# Patient Record
Sex: MALE | Race: WHITE | NOT HISPANIC OR LATINO
[De-identification: names, ages, dates, MRNs, and addresses within clinical notes are randomized per-mention and may not be internally consistent; named-entity substitution may affect disease eponyms.]

---

## 2017-04-13 ENCOUNTER — APPOINTMENT (OUTPATIENT)
Dept: PULMONOLOGY | Facility: CLINIC | Age: 72
End: 2017-04-13

## 2017-04-18 RX ORDER — MOMETASONE FUROATE AND FORMOTEROL FUMARATE DIHYDRATE 200; 5 UG/1; UG/1
2 AEROSOL RESPIRATORY (INHALATION)
Qty: 0 | Refills: 0 | COMMUNITY

## 2017-04-18 RX ORDER — FONDAPARINUX SODIUM 2.5 MG/.5ML
1 INJECTION, SOLUTION SUBCUTANEOUS
Qty: 0 | Refills: 0 | COMMUNITY

## 2017-04-18 RX ORDER — TAMSULOSIN HYDROCHLORIDE 0.4 MG/1
1 CAPSULE ORAL
Qty: 0 | Refills: 0 | COMMUNITY

## 2017-04-18 RX ORDER — PANTOPRAZOLE SODIUM 20 MG/1
1 TABLET, DELAYED RELEASE ORAL
Qty: 0 | Refills: 0 | COMMUNITY

## 2017-04-18 RX ORDER — FUROSEMIDE 40 MG
1 TABLET ORAL
Qty: 0 | Refills: 0 | COMMUNITY

## 2017-04-18 RX ORDER — LOSARTAN POTASSIUM 100 MG/1
1 TABLET, FILM COATED ORAL
Qty: 0 | Refills: 0 | COMMUNITY

## 2017-04-18 RX ORDER — POTASSIUM CHLORIDE 20 MEQ
1 PACKET (EA) ORAL
Qty: 0 | Refills: 0 | COMMUNITY

## 2017-04-18 RX ORDER — TIOTROPIUM BROMIDE AND OLODATEROL 3.124; 2.736 UG/1; UG/1
2 SPRAY, METERED RESPIRATORY (INHALATION)
Qty: 0 | Refills: 0 | COMMUNITY

## 2017-04-18 RX ORDER — LEVOCETIRIZINE DIHYDROCHLORIDE 0.5 MG/ML
1 SOLUTION ORAL
Qty: 0 | Refills: 0 | COMMUNITY

## 2017-04-18 RX ORDER — SERTRALINE 25 MG/1
1 TABLET, FILM COATED ORAL
Qty: 0 | Refills: 0 | COMMUNITY

## 2017-04-18 NOTE — H&P ADULT - PMH
BPH (benign prostatic hyperplasia)    Coronary artery disease    Deep venous thrombosis    Hyperlipidemia    Hypertension

## 2017-04-18 NOTE — H&P ADULT - EXTREMITIES COMMENTS
+ B/L LE Varicosities + RUE forearm ecchymosis from failed blood draws earlier this week.  B/L LE Varicosities

## 2017-04-18 NOTE — H&P ADULT - HISTORY OF PRESENT ILLNESS
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, former smoker/ETOH abuser, with PMHx of HTN, dyslipidemia, GERD, pAfib, RLE DVT on Xarelto (last dose 4/17 PM), subclavian steel syndrome, COPD (denies hospitalizations/intubations/home O2), PVD s/p right fem-pop bypass ~10 yrs ago, Known CAD s/p multiple prior PCIs and s/p 3VCABG @ [place] on 03/12/15 (EDUARD-LAD, LIMA-Ramus, SVG-OM), most recent cardiac catheterization@[facility] on 7/21/16 which revealed 50% dLM lesion, subtotal occlusion of the mLAD, 80% pLCx lesion,  of OM1, small nondominant RCA, EDUARD-pLAD is patent, SVG-OM1 has a 30% lesion at the distal anastomosis site, LIMA to the ramus is patent, EF:55%, 12mmHg. Patient recommended to continue medical therapy.        In light of pt's risk factors, Known CAD, above presenting  CCS Class 3 Anginal Equivalent Symptoms, pt is now referred  to [facility] for recommended left Heart Cath with possible intervention if clinically indicated.      MOST RECENT CATH HX:  @ [facility] 7/21/16: as above  @ [place] on 03/03/15: 2VCAD; 50-60% distal LM, 60-70% proximal LAD ISR, 30-50% mid LAD, Diag 1 stent jailed, 70-80% proximal LCx ISR, 50-60% distal LCx, 30-50% OM1, No AS, LVEF of 50%.   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, former smoker/ETOH abuser, with PMHx of HTN, dyslipidemia, GERD, pAfib, RLE DVT on Xarelto (last dose 4/17 PM), subclavian steel syndrome, COPD (denies hospitalizations/intubations/home O2), PVD s/p right fem-pop bypass ~10 yrs ago, Known CAD s/p multiple prior PCIs and s/p 3VCABG @ [place] on 03/12/15 (EDUARD-LAD, LIMA-Ramus, SVG-OM), most recent cardiac catheterization@[facility] on 7/21/16 which revealed 50% dLM lesion, subtotal occlusion of the mLAD, 80% pLCx lesion,  of OM1, small nondominant RCA, EDUARD-pLAD is patent, SVG-OM1 has a 30% lesion at the distal anastomosis site, LIMA to the ramus is patent, EF:55%, 12mmHg. Patient at which time was recommended to continue medical therapy. Patient now returned to cardiologist c/o progressively worsening ZHANG x several months. Patient states he cannot walk >200 yards prior to becoming winded and fatigued. Patient further reports bilateral LE edema. Patient denies any chest pain, palpitations, dizziness, recent PND or orthopnea.       In light of pt's risk factors, Known CAD, above presenting  CCS Class III Anginal Equivalent Symptoms, pt is now referred  to [facility] for recommended left Heart Cath with possible intervention if clinically indicated.        CATH HX:  @ [facility] 7/21/16: as above  @ [place] on 03/03/15: 2VCAD; 50-60% distal LM, 60-70% proximal LAD ISR, 30-50% mid LAD, Diag 1 stent jailed, 70-80% proximal LCx ISR, 50-60% distal LCx, 30-50% OM1, No AS, LVEF of 50%.   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, former smoker/ETOH abuser, with PMHx of HTN, dyslipidemia, GERD, pAfib, RLE DVT on Xarelto (last dose 4/17 PM), subclavian steel syndrome, COPD (denies hospitalizations/intubations/home O2), PVD s/p right fem-pop bypass ~10 yrs ago, Known CAD s/p multiple prior PCIs and s/p 3VCABG @ [place] on 03/12/15 (EDUARD-LAD, LIMA-Ramus, SVG-OM), most recent cardiac catheterization@[facility] on 7/21/16 which revealed 50% dLM lesion, subtotal occlusion of the mLAD, 80% pLCx lesion,  of OM1, small nondominant RCA, EDUARD-pLAD is patent, SVG-OM1 has a 30% lesion at the distal anastomosis site, LIMA to the ramus is patent, EF:55%, 12mmHg. Patient at which time was recommended to continue medical therapy. Patient now returned to cardiologist c/o progressively worsening ZHANG x several months. Patient states he cannot walk >200 yards prior to becoming winded and fatigued. Patient further reports bilateral LE edema. Patient denies any chest pain, palpitations, dizziness, recent PND or orthopnea. Echocardiogram on 4/13/17 revealed normal LV size and function, EF >55%. Mild MR/TR. Mild pulm HTN.     In light of pt's risk factors, Known CAD, above presenting  CCS Class III Anginal Equivalent Symptoms, pt is now referred  to [facility] for recommended left Heart Cath with possible intervention if clinically indicated.        CATH HX:  @ [facility] 7/21/16: as above  @ [place] on 03/03/15: 2VCAD; 50-60% distal LM, 60-70% proximal LAD ISR, 30-50% mid LAD, Diag 1 stent jailed, 70-80% proximal LCx ISR, 50-60% distal LCx, 30-50% OM1, No AS, LVEF of 50%.   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, former smoker/ETOH abuser, with PMHx of HTN, dyslipidemia, GERD,  CKD Stage 3 (Cr 1.5 last admission in 07/16), pAfib, RLE DVT on Xarelto (last dose 4/17 PM), subclavian steel syndrome, COPD (denies hospitalizations/intubations/home O2), PVD s/p right fem-pop bypass ~10 yrs ago, Known CAD s/p multiple prior PCIs and s/p 3VCABG @ [place] on 03/12/15 (EDUARD-LAD, LIMA-Ramus, SVG-OM), most recent cardiac catheterization@[facility] on 7/21/16 which revealed 50% dLM lesion, subtotal occlusion of the mLAD, 80% pLCx lesion,  of OM1, small nondominant RCA, EDUARD-pLAD is patent, SVG-OM1 has a 30% lesion at the distal anastomosis site, LIMA to the ramus is patent, EF:55%, 12mmHg. Patient at which time was recommended to continue medical therapy. Patient now returned to cardiologist c/o progressively worsening ZHANG x several months. Patient states he cannot walk >200 yards prior to becoming winded and fatigued. Patient further reports bilateral LE edema. Patient denies any chest pain, palpitations, dizziness, recent PND or orthopnea. Echocardiogram on 4/13/17 revealed normal LV size and function, EF >55%. Mild MR/TR. Mild pulm HTN.     In light of pt's risk factors, Known CAD, above presenting  CCS Class III Anginal Equivalent Symptoms, pt is now referred  to [facility] for recommended left Heart Cath with possible intervention if clinically indicated.        CATH HX:  @ [facility] 7/21/16: as above  @ [place] on 03/03/15: 2VCAD; 50-60% distal LM, 60-70% proximal LAD ISR, 30-50% mid LAD, Diag 1 stent jailed, 70-80% proximal LCx ISR, 50-60% distal LCx, 30-50% OM1, No AS, LVEF of 50%.   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, former smoker/ETOH abuser, with PMHx of HTN, dyslipidemia, GERD,  pAfib, RLE DVT on Xarelto (last dose 4/17 PM), subclavian steel syndrome, COPD (denies hospitalizations/intubations/home O2), PVD s/p right fem-pop bypass ~10 yrs ago, Known CAD s/p multiple prior PCIs and s/p 3VCABG @ [place] on 03/12/15 (EDUARD-LAD, LIMA-Ramus, SVG-OM), most recent cardiac catheterization@[facility] on 7/21/16 which revealed 50% dLM lesion, subtotal occlusion of the mLAD, 80% pLCx lesion,  of OM1, small nondominant RCA, EDUARD-pLAD is patent, SVG-OM1 has a 30% lesion at the distal anastomosis site, LIMA to the ramus is patent, EF:55%, 12mmHg. Patient at which time was recommended to continue medical therapy. Patient now returned to cardiologist c/o progressively worsening ZHANG x several months. Patient states he cannot walk >200 yards prior to becoming winded and fatigued. Patient further reports bilateral LE edema. Patient denies any chest pain, palpitations, dizziness, recent PND or orthopnea. Echocardiogram on 4/13/17 revealed normal LV size and function, EF >55%. Mild MR/TR. Mild pulm HTN.     In light of pt's risk factors, Known CAD, above presenting  CCS Class III Anginal Equivalent Symptoms, pt is now referred  to [facility] for recommended left Heart Cath with possible intervention if clinically indicated.        CATH HX:  @ [facility] 7/21/16: as above  @ [place] on 03/03/15: 2VCAD; 50-60% distal LM, 60-70% proximal LAD ISR, 30-50% mid LAD, Diag 1 stent jailed, 70-80% proximal LCx ISR, 50-60% distal LCx, 30-50% OM1, No AS, LVEF of 50%.   Recommended for CABG

## 2017-04-18 NOTE — H&P ADULT - PSH
Arm fracture, right    History of tonsillectomy    S/P CABG (coronary artery bypass graft)    Status post peripheral artery bypass

## 2017-04-18 NOTE — H&P ADULT - ASSESSMENT
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, former smoker/ETOH abuser, with PMHx of HTN, dyslipidemia, GERD, pAfib, RLE DVT on Xarelto (last dose 4/17 PM), subclavian steel syndrome, COPD (denies hospitalizations/intubations/home O2), PVD s/p right fem-pop bypass ~10 yrs ago, Known CAD s/p multiple prior PCIs and s/p 3VCABG @ [place] on 03/12/15 (EDUARD-LAD, LIMA-Ramus, SVG-OM), most recent cardiac catheterization@[facility] on 7/21/16 which revealed 50% dLM lesion, subtotal occlusion of the mLAD, 80% pLCx lesion,  of OM1, small nondominant RCA, EDUARD-pLAD is patent, SVG-OM1 has a 30% lesion at the distal anastomosis site, LIMA to the ramus is patent, EF:55%, 12mmHg. Patient at which time was recommended to continue medical therapy.   He presents today for recommended Cardiac Cath with possible intervention if clinically indicated secondary to CCS Anginal Class 3 Equivalent Symptoms.      ASA: III and Mallampati: III  ***OF NOTE: Pt 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, former smoker/ETOH abuser, with PMHx of HTN, dyslipidemia, GERD, pAfib, RLE DVT on Xarelto (last dose 4/17 PM), subclavian steel syndrome, COPD (denies hospitalizations/intubations/home O2), PVD s/p right fem-pop bypass ~10 yrs ago, Known CAD s/p multiple prior PCIs and s/p 3VCABG @ [place] on 03/12/15 (EDUARD-LAD, LIMA-Ramus, SVG-OM), most recent cardiac catheterization@[facility] on 7/21/16 which revealed 50% dLM lesion, subtotal occlusion of the mLAD, 80% pLCx lesion,  of OM1, small nondominant RCA, EDUARD-pLAD is patent, SVG-OM1 has a 30% lesion at the distal anastomosis site, LIMA to the ramus is patent, EF:55%, 12mmHg. Patient at which time was recommended to continue medical therapy.   He presents today for recommended Cardiac Cath with possible intervention if clinically indicated secondary to CCS Anginal Class 3 Equivalent Symptoms.      ASA: III and Mallampati: III  ***OF NOTE: Pt with CKD Stage 3, Cr    today, prehydrated with NS @ 100cc/hr. 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, former smoker/ETOH abuser, with PMHx of HTN, dyslipidemia, GERD, pAfib, RLE DVT on Xarelto (last dose 4/17 PM), subclavian steel syndrome, COPD (denies hospitalizations/intubations/home O2), PVD s/p right fem-pop bypass ~10 yrs ago, Known CAD s/p multiple prior PCIs and s/p 3VCABG @ [place] on 03/12/15 (EDUARD-LAD, LIMA-Ramus, SVG-OM), most recent cardiac catheterization@[facility] on 7/21/16 which revealed 50% dLM lesion, subtotal occlusion of the mLAD, 80% pLCx lesion,  of OM1, small nondominant RCA, EDUARD-pLAD is patent, SVG-OM1 has a 30% lesion at the distal anastomosis site, LIMA to the ramus is patent, EF:55%, 12mmHg. Patient at which time was recommended to continue medical therapy.   He presents today for recommended Cardiac Cath with possible intervention if clinically indicated secondary to CCS Anginal Class 3 Equivalent Symptoms.      ASA: III and Mallampati: III  ***OF NOTE:  Pt received his daily dose of ASA 325mg PO X 1 and Plavix 75mg PO X 1 prior to procedure today.

## 2017-04-18 NOTE — H&P ADULT - FAMILY HISTORY
Sibling  Still living? Unknown  Family history of cerebrovascular accident (CVA), Age at diagnosis: Age Unknown  Family history of acute myocardial infarction, Age at diagnosis: Age Unknown

## 2017-04-19 ENCOUNTER — TRANSCRIPTION ENCOUNTER (OUTPATIENT)
Age: 72
End: 2017-04-19

## 2017-04-19 ENCOUNTER — INPATIENT (INPATIENT)
Facility: HOSPITAL | Age: 72
LOS: 0 days | Discharge: HOME | DRG: 247 | End: 2017-04-20
Attending: INTERNAL MEDICINE | Admitting: INTERNAL MEDICINE
Payer: COMMERCIAL

## 2017-04-19 VITALS
HEART RATE: 60 BPM | DIASTOLIC BLOOD PRESSURE: 63 MMHG | SYSTOLIC BLOOD PRESSURE: 148 MMHG | RESPIRATION RATE: 16 BRPM | OXYGEN SATURATION: 97 %

## 2017-04-19 DIAGNOSIS — S42.301A UNSPECIFIED FRACTURE OF SHAFT OF HUMERUS, RIGHT ARM, INITIAL ENCOUNTER FOR CLOSED FRACTURE: Chronic | ICD-10-CM

## 2017-04-19 DIAGNOSIS — Z98.89 OTHER SPECIFIED POSTPROCEDURAL STATES: Chronic | ICD-10-CM

## 2017-04-19 DIAGNOSIS — Z95.1 PRESENCE OF AORTOCORONARY BYPASS GRAFT: Chronic | ICD-10-CM

## 2017-04-19 DIAGNOSIS — Z90.89 ACQUIRED ABSENCE OF OTHER ORGANS: Chronic | ICD-10-CM

## 2017-04-19 LAB
ANION GAP SERPL CALC-SCNC: 10 MMOL/L — SIGNIFICANT CHANGE UP (ref 9–16)
APTT BLD: 36.6 SEC — SIGNIFICANT CHANGE UP (ref 27.5–37.4)
BASOPHILS NFR BLD AUTO: 0.4 % — SIGNIFICANT CHANGE UP (ref 0–2)
BUN SERPL-MCNC: 27 MG/DL — HIGH (ref 7–23)
CALCIUM SERPL-MCNC: 9.3 MG/DL — SIGNIFICANT CHANGE UP (ref 8.5–10.5)
CHLORIDE SERPL-SCNC: 106 MMOL/L — SIGNIFICANT CHANGE UP (ref 96–108)
CHOLEST SERPL-MCNC: 163 MG/DL — SIGNIFICANT CHANGE UP
CO2 SERPL-SCNC: 24 MMOL/L — SIGNIFICANT CHANGE UP (ref 22–31)
CREAT SERPL-MCNC: 1.29 MG/DL — SIGNIFICANT CHANGE UP (ref 0.5–1.3)
CRP SERPL-MCNC: 0.44 MG/DL — HIGH
EOSINOPHIL NFR BLD AUTO: 4.2 % — SIGNIFICANT CHANGE UP (ref 0–6)
GLUCOSE SERPL-MCNC: 116 MG/DL — HIGH (ref 70–99)
HBA1C BLD-MCNC: 5.8 % — HIGH (ref 4.8–5.6)
HCT VFR BLD CALC: 43.9 % — SIGNIFICANT CHANGE UP (ref 39–50)
HDLC SERPL-MCNC: 43 MG/DL — SIGNIFICANT CHANGE UP
HGB BLD-MCNC: 15.2 G/DL — SIGNIFICANT CHANGE UP (ref 13–17)
INR BLD: 0.98 — SIGNIFICANT CHANGE UP (ref 0.88–1.16)
LIPID PNL WITH DIRECT LDL SERPL: 72 MG/DL — SIGNIFICANT CHANGE UP
LYMPHOCYTES # BLD AUTO: 17.2 % — SIGNIFICANT CHANGE UP (ref 13–44)
MCHC RBC-ENTMCNC: 30.5 PG — SIGNIFICANT CHANGE UP (ref 27–34)
MCHC RBC-ENTMCNC: 34.6 G/DL — SIGNIFICANT CHANGE UP (ref 32–36)
MCV RBC AUTO: 88 FL — SIGNIFICANT CHANGE UP (ref 80–100)
MONOCYTES NFR BLD AUTO: 9.9 % — SIGNIFICANT CHANGE UP (ref 2–14)
NEUTROPHILS NFR BLD AUTO: 68.3 % — SIGNIFICANT CHANGE UP (ref 43–77)
PLATELET # BLD AUTO: 210 K/UL — SIGNIFICANT CHANGE UP (ref 150–400)
POTASSIUM SERPL-MCNC: 3.6 MMOL/L — SIGNIFICANT CHANGE UP (ref 3.5–5.3)
POTASSIUM SERPL-SCNC: 3.6 MMOL/L — SIGNIFICANT CHANGE UP (ref 3.5–5.3)
PROTHROM AB SERPL-ACNC: 10.9 SEC — SIGNIFICANT CHANGE UP (ref 9.8–12.7)
RBC # BLD: 4.99 M/UL — SIGNIFICANT CHANGE UP (ref 4.2–5.8)
RBC # FLD: 14 % — SIGNIFICANT CHANGE UP (ref 10.3–16.9)
SODIUM SERPL-SCNC: 140 MMOL/L — SIGNIFICANT CHANGE UP (ref 135–145)
TOTAL CHOLESTEROL/HDL RATIO MEASUREMENT: 3.8 RATIO — SIGNIFICANT CHANGE UP
TRIGL SERPL-MCNC: 239 MG/DL — HIGH
WBC # BLD: 9.2 K/UL — SIGNIFICANT CHANGE UP (ref 3.8–10.5)
WBC # FLD AUTO: 9.2 K/UL — SIGNIFICANT CHANGE UP (ref 3.8–10.5)

## 2017-04-19 PROCEDURE — 92928 PRQ TCAT PLMT NTRAC ST 1 LES: CPT | Mod: LC

## 2017-04-19 PROCEDURE — 93461 R&L HRT ART/VENTRICLE ANGIO: CPT | Mod: 26

## 2017-04-19 PROCEDURE — 93010 ELECTROCARDIOGRAM REPORT: CPT

## 2017-04-19 RX ORDER — FUROSEMIDE 40 MG
40 TABLET ORAL DAILY
Qty: 0 | Refills: 0 | Status: DISCONTINUED | OUTPATIENT
Start: 2017-04-19 | End: 2017-04-20

## 2017-04-19 RX ORDER — CLOPIDOGREL BISULFATE 75 MG/1
75 TABLET, FILM COATED ORAL ONCE
Qty: 0 | Refills: 0 | Status: COMPLETED | OUTPATIENT
Start: 2017-04-19 | End: 2017-04-19

## 2017-04-19 RX ORDER — SERTRALINE 25 MG/1
50 TABLET, FILM COATED ORAL DAILY
Qty: 0 | Refills: 0 | Status: DISCONTINUED | OUTPATIENT
Start: 2017-04-19 | End: 2017-04-20

## 2017-04-19 RX ORDER — ASPIRIN/CALCIUM CARB/MAGNESIUM 324 MG
81 TABLET ORAL DAILY
Qty: 0 | Refills: 0 | Status: DISCONTINUED | OUTPATIENT
Start: 2017-04-20 | End: 2017-04-20

## 2017-04-19 RX ORDER — ATORVASTATIN CALCIUM 80 MG/1
20 TABLET, FILM COATED ORAL AT BEDTIME
Qty: 0 | Refills: 0 | Status: DISCONTINUED | OUTPATIENT
Start: 2017-04-19 | End: 2017-04-20

## 2017-04-19 RX ORDER — ASPIRIN/CALCIUM CARB/MAGNESIUM 324 MG
325 TABLET ORAL ONCE
Qty: 0 | Refills: 0 | Status: COMPLETED | OUTPATIENT
Start: 2017-04-19 | End: 2017-04-19

## 2017-04-19 RX ORDER — SODIUM CHLORIDE 9 MG/ML
500 INJECTION INTRAMUSCULAR; INTRAVENOUS; SUBCUTANEOUS
Qty: 0 | Refills: 0 | Status: DISCONTINUED | OUTPATIENT
Start: 2017-04-19 | End: 2017-04-19

## 2017-04-19 RX ORDER — SODIUM CHLORIDE 9 MG/ML
1000 INJECTION INTRAMUSCULAR; INTRAVENOUS; SUBCUTANEOUS
Qty: 0 | Refills: 0 | Status: DISCONTINUED | OUTPATIENT
Start: 2017-04-19 | End: 2017-04-20

## 2017-04-19 RX ORDER — TAMSULOSIN HYDROCHLORIDE 0.4 MG/1
0.4 CAPSULE ORAL AT BEDTIME
Qty: 0 | Refills: 0 | Status: DISCONTINUED | OUTPATIENT
Start: 2017-04-19 | End: 2017-04-20

## 2017-04-19 RX ORDER — RIVAROXABAN 15 MG-20MG
15 KIT ORAL DAILY
Qty: 0 | Refills: 0 | Status: DISCONTINUED | OUTPATIENT
Start: 2017-04-19 | End: 2017-04-20

## 2017-04-19 RX ORDER — LOSARTAN POTASSIUM 100 MG/1
25 TABLET, FILM COATED ORAL DAILY
Qty: 0 | Refills: 0 | Status: DISCONTINUED | OUTPATIENT
Start: 2017-04-19 | End: 2017-04-20

## 2017-04-19 RX ORDER — PANTOPRAZOLE SODIUM 20 MG/1
40 TABLET, DELAYED RELEASE ORAL
Qty: 0 | Refills: 0 | Status: DISCONTINUED | OUTPATIENT
Start: 2017-04-19 | End: 2017-04-20

## 2017-04-19 RX ORDER — BUDESONIDE AND FORMOTEROL FUMARATE DIHYDRATE 160; 4.5 UG/1; UG/1
2 AEROSOL RESPIRATORY (INHALATION)
Qty: 0 | Refills: 0 | Status: DISCONTINUED | OUTPATIENT
Start: 2017-04-19 | End: 2017-04-20

## 2017-04-19 RX ORDER — CLOPIDOGREL BISULFATE 75 MG/1
75 TABLET, FILM COATED ORAL DAILY
Qty: 0 | Refills: 0 | Status: DISCONTINUED | OUTPATIENT
Start: 2017-04-20 | End: 2017-04-20

## 2017-04-19 RX ORDER — LORATADINE 10 MG/1
10 TABLET ORAL DAILY
Qty: 0 | Refills: 0 | Status: DISCONTINUED | OUTPATIENT
Start: 2017-04-19 | End: 2017-04-20

## 2017-04-19 RX ORDER — POTASSIUM CHLORIDE 20 MEQ
40 PACKET (EA) ORAL ONCE
Qty: 0 | Refills: 0 | Status: COMPLETED | OUTPATIENT
Start: 2017-04-19 | End: 2017-04-19

## 2017-04-19 RX ADMIN — BUDESONIDE AND FORMOTEROL FUMARATE DIHYDRATE 2 PUFF(S): 160; 4.5 AEROSOL RESPIRATORY (INHALATION) at 17:02

## 2017-04-19 RX ADMIN — SERTRALINE 50 MILLIGRAM(S): 25 TABLET, FILM COATED ORAL at 17:01

## 2017-04-19 RX ADMIN — PANTOPRAZOLE SODIUM 40 MILLIGRAM(S): 20 TABLET, DELAYED RELEASE ORAL at 21:25

## 2017-04-19 RX ADMIN — CLOPIDOGREL BISULFATE 75 MILLIGRAM(S): 75 TABLET, FILM COATED ORAL at 08:40

## 2017-04-19 RX ADMIN — ATORVASTATIN CALCIUM 20 MILLIGRAM(S): 80 TABLET, FILM COATED ORAL at 21:25

## 2017-04-19 RX ADMIN — LOSARTAN POTASSIUM 25 MILLIGRAM(S): 100 TABLET, FILM COATED ORAL at 17:02

## 2017-04-19 RX ADMIN — Medication 325 MILLIGRAM(S): at 08:40

## 2017-04-19 RX ADMIN — LORATADINE 10 MILLIGRAM(S): 10 TABLET ORAL at 17:02

## 2017-04-19 RX ADMIN — Medication 40 MILLIEQUIVALENT(S): at 16:37

## 2017-04-19 RX ADMIN — RIVAROXABAN 15 MILLIGRAM(S): KIT at 17:05

## 2017-04-19 RX ADMIN — TAMSULOSIN HYDROCHLORIDE 0.4 MILLIGRAM(S): 0.4 CAPSULE ORAL at 21:25

## 2017-04-19 NOTE — DISCHARGE NOTE ADULT - INSTRUCTIONS
You underwent a coronary angiogram and should wait 3 days before returning to ordinary activities. Do not drive for 2 days. Consult your doctor before returning to vigorous activity. You may return to work in 3-5 days. The catheter from your left groin was removed and you should remove the dressing in 24 hours. You may shower once the dressing is removed, but avoid baths, hot tubs, or swimming for 5 days to prevent infection. If you notice bleeding from the site, hardening and pain at the site, drainage or redness from the site, coolness/paleness of the left leg, weakness/paralysis of left leg, leg swelling, or fever, please call [phone]. Please continue your aspirin and Plavix as prescribed unless otherwise indicated by your cardiologist. Please follow up with Dr. Treviño in 1-2 weeks. All of your needed prescriptions have been sent electronically to your pharmacy. You underwent a coronary angiogram and should wait 3 days before returning to ordinary activities. Do not drive for 2 days. Consult your doctor before returning to vigorous activity. You may return to work in 3-5 days. The catheter from your left groin . You may shower once the dressing is removed, but avoid baths, hot tubs, or swimming for 5 days to prevent infection. If you notice bleeding from the site, hardening and pain at the site, drainage or redness from the site, coolness/paleness of the left leg, weakness/paralysis of left leg, leg swelling, or fever, please call [phone]. Please continue your aspirin and Plavix as prescribed unless otherwise indicated by your cardiologist. Please follow up with Dr. Treviño in 1-2 weeks. All of your needed prescriptions have been sent electronically to your pharmacy.

## 2017-04-19 NOTE — DISCHARGE NOTE ADULT - CARE PROVIDERS DIRECT ADDRESSES
[email or web address]

## 2017-04-19 NOTE — DISCHARGE NOTE ADULT - PLAN OF CARE
You had a cardiac angiogram Monitor BP. Maintain Low Salt Diet. Continue Losartan. Maintain Low Cholesterol Diet. Continue Atorvastatin Continue Xarelto for history of blood clot in right leg. You had a cardiac angiogram with stent placement to one of your heart arteries (Left Main extending into Left Circumflex). Continue Aspirin and Plavix. DO NOT STOP THESE MEDICATIONS UNLESS INSTRUCTED BY YOUR CARDIOLOGIST AS YOUR STENTS CAN CLOSE AND YOU CAN HAVE A HEART ATTACK. Follow-up with Dr. Treviño in 1-2 weeks. Continue Atorvastatin. You were found to have an elevated Hemoglobin A1C (average measurement of how well your sugars are controlled over past three months) of 5.8%. You were found to have an elevated Hemoglobin A1C (average measurement of how well your sugars are controlled over past three months) of 5.8%. DECREASE AMOUNT OF CARBOHYDATES IN YOUR DIET (RICE, PASTA, BREAD). Follow-up with your Primary Care Physician. Maintain Low Fat, Low Cholesterol, and Low Salt Diet. You had a few beats of an abnormal heart rhythm after procedure. Consider event monitor as an outpatient.

## 2017-04-19 NOTE — DISCHARGE NOTE ADULT - HOSPITAL COURSE
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, former smoker/ETOH abuser, with PMHx of HTN, dyslipidemia, GERD,  pAfib, RLE DVT on Xarelto (last dose 4/17 PM), subclavian steel syndrome, COPD (denies hospitalizations/intubations/home O2), PVD s/p right fem-pop bypass ~10 yrs ago, Known CAD s/p multiple prior PCIs and s/p 3VCABG @ [place] on 03/12/15 (EDUARD-LAD, LIMA-Ramus, SVG-OM), most recent cardiac catheterization@[facility] on 7/21/16 which revealed 50% dLM lesion, subtotal occlusion of the mLAD, 80% pLCx lesion,  of OM1, small nondominant RCA, EDUARD-pLAD is patent, SVG-OM1 has a 30% lesion at the distal anastomosis site, LIMA to the ramus is patent, EF:55%, 12mmHg. Patient at which time was recommended to continue medical therapy. Patient now returned to cardiologist c/o progressively worsening ZHANG x several months. Patient states he cannot walk >200 yards prior to becoming winded and fatigued. Patient further reports bilateral LE edema. Patient denies any chest pain, palpitations, dizziness, recent PND or orthopnea. Echocardiogram on 4/13/17 revealed normal LV size and function, EF >55%. Mild MR/TR. Mild pulm HTN. Patient s/p cardiac cath 4/19 JO to LM 60% into prox LCx 90%, patent EDUARD-LAD, LIMA-ramus, SVG-OM1, LV EF 60%, NL RHC, L 6Fr max venous at 130, L PC 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, former smoker/ETOH abuser, with PMHx of HTN, dyslipidemia, GERD,  pAfib, RLE DVT on Xarelto (last dose 4/17 PM), subclavian steel syndrome, COPD (denies hospitalizations/intubations/home O2), PVD s/p right fem-pop bypass ~10 yrs ago, Known CAD s/p multiple prior PCIs and s/p 3VCABG @ [place] on 03/12/15 (EDUARD-LAD, LIMA-Ramus, SVG-OM), most recent cardiac catheterization@[facility] on 7/21/16 which revealed 50% dLM lesion, subtotal occlusion of the mLAD, 80% pLCx lesion,  of OM1, small nondominant RCA, EDUARD-pLAD is patent, SVG-OM1 has a 30% lesion at the distal anastomosis site, LIMA to the ramus is patent, EF:55%, 12mmHg. Patient at which time was recommended to continue medical therapy. Patient now returned to cardiologist c/o progressively worsening ZHANG x several months. Patient states he cannot walk >200 yards prior to becoming winded and fatigued. Patient further reports bilateral LE edema. Patient denies any chest pain, palpitations, dizziness, recent PND or orthopnea. Echocardiogram on 4/13/17 revealed normal LV size and function, EF >55%. Mild MR/TR. Mild pulm HTN. Patient s/p cardiac cath 4/19 JO to LM 60% into prox LCx 90%, patent EDUARD-LAD, LIMA-ramus, SVG-OM1, LV EF 60%, NL RHC, L 6Fr max venous at 130, L PC    VS:  	BP: 140’s/70s		P: 80s		RR:	16	Temp 97.6 F   O2 sat- 94 % RA    CVS: + S1 S2. RRR. No murmurs, rubs or gallops.  Pulm: CTA Bilaterally. No rhonchi, wheezes, or rales.  Abd: BS x 4. Soft NT/ND.  Ext: No clubbing, cyanosis, or edema BLE. No calf tenderness BLE.   Left Groin: Soft. Non-tender. No hematoma, bleed or bruit.   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, former smoker/ETOH abuser, with PMHx of HTN, dyslipidemia, GERD,  pAfib, RLE DVT on Xarelto (last dose 4/17 PM), subclavian steel syndrome, COPD (denies hospitalizations/intubations/home O2), PVD s/p right fem-pop bypass ~10 yrs ago, Known CAD s/p multiple prior PCIs and s/p 3VCABG @ [place] on 03/12/15 (EDUARD-LAD, LIMA-Ramus, SVG-OM), most recent cardiac catheterization@[facility] on 7/21/16 which revealed 50% dLM lesion, subtotal occlusion of the mLAD, 80% pLCx lesion,  of OM1, small nondominant RCA, EDUARD-pLAD is patent, SVG-OM1 has a 30% lesion at the distal anastomosis site, LIMA to the ramus is patent, EF:55%, 12mmHg. Patient at which time was recommended to continue medical therapy. Patient now returned to cardiologist c/o progressively worsening ZHANG x several months. Patient states he cannot walk >200 yards prior to becoming winded and fatigued. Patient further reports bilateral LE edema. Patient denies any chest pain, palpitations, dizziness, recent PND or orthopnea. Echocardiogram on 4/13/17 revealed normal LV size and function, EF >55%. Mild MR/TR. Mild pulm HTN. Patient s/p cardiac cath 4/19 JO to LM 60% into prox LCx 90%, patent EDUARD-LAD, LIMA-ramus, SVG-OM1, LV EF 60%, NL RHC. Patient C/P free and HD stable and cleared for discharge by Dr. Benedict. Labs and telemetry reviewed. Patient reports he has all his medications at home. Prescriptions for ASA, Plavix, and NTG E-Prescribed to [facility].     VS:  	BP: 140’s/70s		P: 80s		RR:	16	Temp 97.6 F   O2 sat- 94 % RA    CVS: + S1 S2. RRR. No murmurs, rubs or gallops.  Pulm: CTA Bilaterally. No rhonchi, wheezes, or rales.  Abd: BS x 4. Soft NT/ND.  Ext: No clubbing, cyanosis, or edema BLE. No calf tenderness BLE.   Left Groin: Soft. Non-tender. No hematoma, bleed or bruit.   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, former smoker/ETOH abuser, with PMHx of HTN, dyslipidemia, GERD,  pAfib, RLE DVT on Xarelto (last dose 4/17 PM), subclavian steel syndrome, COPD (denies hospitalizations/intubations/home O2), PVD s/p right fem-pop bypass ~10 yrs ago, Known CAD s/p multiple prior PCIs and s/p 3VCABG @ [place] on 03/12/15 (EDUARD-LAD, LIMA-Ramus, SVG-OM), most recent cardiac catheterization@[facility] on 7/21/16 which revealed 50% dLM lesion, subtotal occlusion of the mLAD, 80% pLCx lesion,  of OM1, small nondominant RCA, EDUARD-pLAD is patent, SVG-OM1 has a 30% lesion at the distal anastomosis site, LIMA to the ramus is patent, EF:55%, 12mmHg. Patient at which time was recommended to continue medical therapy. Patient now returned to cardiologist c/o progressively worsening ZHANG x several months. Patient states he cannot walk >200 yards prior to becoming winded and fatigued. Patient further reports bilateral LE edema. Patient denies any chest pain, palpitations, dizziness, recent PND or orthopnea. Echocardiogram on 4/13/17 revealed normal LV size and function, EF >55%. Mild MR/TR. Mild pulm HTN. Patient s/p cardiac cath 4/19 JO to LM 60% into prox LCx 90%, patent EDUARD-LAD, LIMA-ramus, SVG-OM1, LV EF 60%, NL RHC. Patient C/P free and HD stable and cleared for discharge by Dr. Benedict. Labs and telemetry reviewed. 9 bts of NSVT overnight patient reported chest pain. NSVT has not recurred overnight may be secondary to revascularization. Patient to follow-up with Dr. Treviño in 1-2 weeks. Patient reports he has all his medications at home. Prescriptions for ASA, Plavix, and NTG E-Prescribed to [facility].     VS:  	BP: 140’s/70s		P: 80s		RR:	16	Temp 97.6 F   O2 sat- 94 % RA    CVS: + S1 S2. RRR. No murmurs, rubs or gallops.  Pulm: CTA Bilaterally. No rhonchi, wheezes, or rales.  Abd: BS x 4. Soft NT/ND.  Ext: No clubbing, cyanosis, or edema BLE. No calf tenderness BLE.   Left Groin: Soft. Non-tender. No hematoma, bleed or bruit.   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, former smoker/ETOH abuser, with PMHx of HTN, dyslipidemia, GERD,  pAfib, RLE DVT on Xarelto (last dose 4/17 PM), subclavian steel syndrome, COPD (denies hospitalizations/intubations/home O2), PVD s/p right fem-pop bypass ~10 yrs ago, Known CAD s/p multiple prior PCIs and s/p 3VCABG @ [place] on 03/12/15 (EDUARD-LAD, LIMA-Ramus, SVG-OM), most recent cardiac catheterization@[facility] on 7/21/16 which revealed 50% dLM lesion, subtotal occlusion of the mLAD, 80% pLCx lesion,  of OM1, small nondominant RCA, EDUARD-pLAD is patent, SVG-OM1 has a 30% lesion at the distal anastomosis site, LIMA to the ramus is patent, EF:55%, 12mmHg. Patient at which time was recommended to continue medical therapy. Patient now returned to cardiologist c/o progressively worsening ZHANG x several months. Patient states he cannot walk >200 yards prior to becoming winded and fatigued. Patient further reports bilateral LE edema. Patient denies any chest pain, palpitations, dizziness, recent PND or orthopnea. Echocardiogram on 4/13/17 revealed normal LV size and function, EF >55%. Mild MR/TR. Mild pulm HTN. Patient s/p cardiac cath 4/19 JO to LM 60% into prox LCx 90%, patent EDUARD-LAD, LIMA-ramus, SVG-OM1, LV EF 60%, NL RHC. Patient C/P free and HD stable and cleared for discharge by Dr. Benedict. Labs and telemetry reviewed. 9 bts of NSVT overnight patient reported chest pain. NSVT has not recurred this AM and may be secondary to revascularization. No BB at this time due to COPD and cough. Consider event monitor as an outpatient to further evaluate ectopy and consider initiating Beta Blocker if frequent ectopy noted. Patient to follow-up with Dr. Treviño in 1-2 weeks. Patient reports he has all his medications at home. Prescriptions for ASA, Plavix, and NTG E-Prescribed to [facility].     VS:  	BP: 140’s/70s		P: 80s		RR:	16	Temp 97.6 F   O2 sat- 94 % RA    CVS: + S1 S2. RRR. No murmurs, rubs or gallops.  Pulm: CTA Bilaterally. No rhonchi, wheezes, or rales.  Abd: BS x 4. Soft NT/ND.  Ext: No clubbing, cyanosis, or edema BLE. No calf tenderness BLE.   Left Groin: Soft. Non-tender. No hematoma, bleed or bruit.   Distal Pulses Intact

## 2017-04-19 NOTE — DISCHARGE NOTE ADULT - MEDICATION SUMMARY - MEDICATIONS TO TAKE
I will START or STAY ON the medications listed below when I get home from the hospital:    aspirin 81 mg oral delayed release tablet  -- 1 tab(s) by mouth once a day  -- Indication: For Coronary artery disease (Heart), Stent    losartan 25 mg oral tablet  -- 1 tab(s) by mouth once a day  -- Indication: For Hypertension (Blood Pressure),     tamsulosin 0.4 mg oral capsule  -- 1 cap(s) by mouth once a day  -- Indication: For BPH (Prostate)     nitroglycerin 0.4 mg sublingual tablet  -- 1 tab(s) under tongue every 5 minutes, As Needed - for chest pain Maximum Up To Three Doses  -- Indication: For Chest Pain    Xarelto 15 mg oral tablet  -- 1 tab(s) by mouth once a day (in the evening)- last dose 4/17  PM  -- Indication: For Right Leg Blood Clot, Paroxysmal Atrial Fibrillation (Blood Thinner)    sertraline 50 mg oral tablet  -- 1 tab(s) by mouth once a day  -- Indication: For Depression    levocetirizine 5 mg oral tablet  -- 1 tab(s) by mouth once a day (in the evening), As Needed  -- Indication: For Allergies    atorvastatin 20 mg oral tablet  -- 1 tab(s) by mouth every other day (at bedtime)  -- Indication: For Hyperlipidemia (Cholesterol) , Coronary artery disease (Heart),    clopidogrel 75 mg oral tablet  -- 1 tab(s) by mouth once a day  -- Indication: For Coronary artery disease (Heart), Stent    Dulera 100 mcg-5 mcg/inh inhalation aerosol  -- 2 puff(s) inhaled 2 times a day  -- Indication: For CoPD (Lungs)    Stiolto Respimat 2.5 mcg-2.5 mcg inhalation aerosol  -- 2 puff(s) inhaled every 24 hours  -- Indication: For CoPD (Lungs)    furosemide 40 mg oral tablet  -- 1 tab(s) by mouth once a day  -- Indication: For Fluid , Blood Pressure    potassium chloride 10 mEq oral tablet, extended release  -- 1 tab(s) by mouth once a day  -- Indication: For Supplement    pantoprazole 40 mg oral delayed release tablet  -- 1 tab(s) by mouth once a day  -- Indication: For Stomach I will START or STAY ON the medications listed below when I get home from the hospital:    aspirin 81 mg oral delayed release tablet  -- 1 tab(s) by mouth once a day  -- Indication: For Coronary artery disease (Heart), Stent    losartan 25 mg oral tablet  -- 1 tab(s) by mouth once a day  -- Indication: For Hypertension (Blood Pressure),     tamsulosin 0.4 mg oral capsule  -- 1 cap(s) by mouth once a day  -- Indication: For BPH (Prostate)     nitroglycerin 0.4 mg sublingual tablet  -- 1 tab(s) under tongue every 5 minutes, As Needed - for chest pain Maximum Up To Three Doses  -- Indication: For Chest Pain    Xarelto 15 mg oral tablet  -- 1 tab(s) by mouth once a day (in the evening)- last dose 4/17  PM  -- Indication: For Right Leg Blood Clot, Paroxysmal Atrial Fibrillation (Blood Thinner)    sertraline 50 mg oral tablet  -- 1 tab(s) by mouth once a day  -- Indication: For Depression    levocetirizine 5 mg oral tablet  -- 1 tab(s) by mouth once a day (in the evening), As Needed  -- Indication: For Allergies    atorvastatin 20 mg oral tablet  -- 1 tab(s) by mouth every other day (at bedtime)  -- Indication: For Hyperlipidemia (Cholesterol) , Coronary artery disease (Heart),    clopidogrel 75 mg oral tablet  -- 1 tab(s) by mouth once a day  -- Indication: For Coronary artery disease (Heart), Stent    Dulera 100 mcg-5 mcg/inh inhalation aerosol  -- 2 puff(s) inhaled 2 times a day  -- Indication: For CoPD (Lungs)    Stiolto Respimat 2.5 mcg-2.5 mcg inhalation aerosol  -- 2 puff(s) inhaled every 24 hours  -- Indication: For CoPD (Lungs)    furosemide 40 mg oral tablet  -- 1 tab(s) by mouth once a day  -- Indication: For Fluid , Blood Pressure    potassium chloride 10 mEq oral tablet, extended release  -- 1 tab(s) by mouth once a day  -- Indication: For Supplement    pantoprazole 40 mg oral delayed release tablet  -- 1 tab(s) by mouth once a day  -- Indication: For Stomach Protection, Acid Reflux

## 2017-04-19 NOTE — DISCHARGE NOTE ADULT - CARE PLAN
Principal Discharge DX:	Coronary artery disease  Instructions for follow-up, activity and diet:	You had a cardiac angiogram  Secondary Diagnosis:	Hypertension  Secondary Diagnosis:	Hyperlipidemia  Secondary Diagnosis:	DVT, lower extremity Principal Discharge DX:	Coronary artery disease  Instructions for follow-up, activity and diet:	You had a cardiac angiogram with stent placement to one of your heart arteries (Left Main extending into Left Circumflex). Continue Aspirin and Plavix. DO NOT STOP THESE MEDICATIONS UNLESS INSTRUCTED BY YOUR CARDIOLOGIST AS YOUR STENTS CAN CLOSE AND YOU CAN HAVE A HEART ATTACK. Follow-up with Dr. Treviño in 1-2 weeks. Continue Atorvastatin.  Secondary Diagnosis:	Hypertension  Instructions for follow-up, activity and diet:	Monitor BP. Maintain Low Salt Diet. Continue Losartan.  Secondary Diagnosis:	Hyperlipidemia  Instructions for follow-up, activity and diet:	Maintain Low Cholesterol Diet. Continue Atorvastatin  Secondary Diagnosis:	DVT, lower extremity  Instructions for follow-up, activity and diet:	Continue Xarelto for history of blood clot in right leg. Principal Discharge DX:	Coronary artery disease  Instructions for follow-up, activity and diet:	You had a cardiac angiogram with stent placement to one of your heart arteries (Left Main extending into Left Circumflex). Continue Aspirin and Plavix. DO NOT STOP THESE MEDICATIONS UNLESS INSTRUCTED BY YOUR CARDIOLOGIST AS YOUR STENTS CAN CLOSE AND YOU CAN HAVE A HEART ATTACK. Follow-up with Dr. Treviño in 1-2 weeks. Continue Atorvastatin.  Secondary Diagnosis:	Hypertension  Instructions for follow-up, activity and diet:	Monitor BP. Maintain Low Salt Diet. Continue Losartan.  Secondary Diagnosis:	Hyperlipidemia  Instructions for follow-up, activity and diet:	Maintain Low Cholesterol Diet. Continue Atorvastatin  Secondary Diagnosis:	DVT, lower extremity  Instructions for follow-up, activity and diet:	Continue Xarelto for history of blood clot in right leg.  Secondary Diagnosis:	Pre-diabetes  Instructions for follow-up, activity and diet:	You were found to have an elevated Hemoglobin A1C (average measurement of how well your sugars are controlled over past three months) of 5.8%. Principal Discharge DX:	Coronary artery disease  Instructions for follow-up, activity and diet:	You had a cardiac angiogram with stent placement to one of your heart arteries (Left Main extending into Left Circumflex). Continue Aspirin and Plavix. DO NOT STOP THESE MEDICATIONS UNLESS INSTRUCTED BY YOUR CARDIOLOGIST AS YOUR STENTS CAN CLOSE AND YOU CAN HAVE A HEART ATTACK. Follow-up with Dr. Treviño in 1-2 weeks. Continue Atorvastatin.  Secondary Diagnosis:	Hypertension  Instructions for follow-up, activity and diet:	Monitor BP. Maintain Low Salt Diet. Continue Losartan.  Secondary Diagnosis:	Hyperlipidemia  Instructions for follow-up, activity and diet:	Maintain Low Cholesterol Diet. Continue Atorvastatin  Secondary Diagnosis:	DVT, lower extremity  Instructions for follow-up, activity and diet:	Continue Xarelto for history of blood clot in right leg.  Secondary Diagnosis:	Pre-diabetes  Instructions for follow-up, activity and diet:	You were found to have an elevated Hemoglobin A1C (average measurement of how well your sugars are controlled over past three months) of 5.8%. DECREASE AMOUNT OF CARBOHYDATES IN YOUR DIET (RICE, PASTA, BREAD). Follow-up with your Primary Care Physician. Principal Discharge DX:	Coronary artery disease  Goal:	Maintain Low Fat, Low Cholesterol, and Low Salt Diet.  Instructions for follow-up, activity and diet:	You had a cardiac angiogram with stent placement to one of your heart arteries (Left Main extending into Left Circumflex). Continue Aspirin and Plavix. DO NOT STOP THESE MEDICATIONS UNLESS INSTRUCTED BY YOUR CARDIOLOGIST AS YOUR STENTS CAN CLOSE AND YOU CAN HAVE A HEART ATTACK. Follow-up with Dr. Treviño in 1-2 weeks. Continue Atorvastatin.  Secondary Diagnosis:	Hypertension  Instructions for follow-up, activity and diet:	Monitor BP. Maintain Low Salt Diet. Continue Losartan.  Secondary Diagnosis:	Hyperlipidemia  Instructions for follow-up, activity and diet:	Maintain Low Cholesterol Diet. Continue Atorvastatin  Secondary Diagnosis:	DVT, lower extremity  Instructions for follow-up, activity and diet:	Continue Xarelto for history of blood clot in right leg.  Secondary Diagnosis:	Pre-diabetes  Instructions for follow-up, activity and diet:	You were found to have an elevated Hemoglobin A1C (average measurement of how well your sugars are controlled over past three months) of 5.8%. DECREASE AMOUNT OF CARBOHYDATES IN YOUR DIET (RICE, PASTA, BREAD). Follow-up with your Primary Care Physician. Principal Discharge DX:	Coronary artery disease  Goal:	Maintain Low Fat, Low Cholesterol, and Low Salt Diet.  Instructions for follow-up, activity and diet:	You had a cardiac angiogram with stent placement to one of your heart arteries (Left Main extending into Left Circumflex). Continue Aspirin and Plavix. DO NOT STOP THESE MEDICATIONS UNLESS INSTRUCTED BY YOUR CARDIOLOGIST AS YOUR STENTS CAN CLOSE AND YOU CAN HAVE A HEART ATTACK. Follow-up with Dr. Treviño in 1-2 weeks. Continue Atorvastatin.  Secondary Diagnosis:	Hypertension  Instructions for follow-up, activity and diet:	Monitor BP. Maintain Low Salt Diet. Continue Losartan.  Secondary Diagnosis:	Hyperlipidemia  Instructions for follow-up, activity and diet:	Maintain Low Cholesterol Diet. Continue Atorvastatin  Secondary Diagnosis:	DVT, lower extremity  Instructions for follow-up, activity and diet:	Continue Xarelto for history of blood clot in right leg.  Secondary Diagnosis:	Pre-diabetes  Instructions for follow-up, activity and diet:	You were found to have an elevated Hemoglobin A1C (average measurement of how well your sugars are controlled over past three months) of 5.8%. DECREASE AMOUNT OF CARBOHYDATES IN YOUR DIET (RICE, PASTA, BREAD). Follow-up with your Primary Care Physician.  Secondary Diagnosis:	NSVT (nonsustained ventricular tachycardia)  Instructions for follow-up, activity and diet:	You had a few beats of an abnormal heart rhythm after procedure. Consider event monitor as an outpatient.

## 2017-04-19 NOTE — DISCHARGE NOTE ADULT - SECONDARY DIAGNOSIS.
Hypertension Hyperlipidemia DVT, lower extremity Pre-diabetes NSVT (nonsustained ventricular tachycardia)

## 2017-04-19 NOTE — DISCHARGE NOTE ADULT - CARE PROVIDER_API CALL
Phill Treviño), Cardiology; Interventional Cardiology  [address] [place] [address]  Phone: [phone]  Fax: [phone]

## 2017-04-19 NOTE — CONSULT NOTE ADULT - SUBJECTIVE AND OBJECTIVE BOX
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 on 7/21/16 which revealed 50% dLM lesion, subtotal occlusion of the mLAD, 80% pLCx lesion,  of OM1, small nondominant RCA, EDUARD-pLAD is patent, SVG-OM1 has a 30% lesion at the distal anastomosis site, LIMA to the ramus is patent, EF:55%, 12mmHg. Patient at which time was recommended to continue medical therapy. Patient now returned to cardiologist c/o progressively worsening ZHANG x several months. Patient states he cannot walk >200 yards prior to becoming winded and fatigued. Patient further reports bilateral LE edema. Patient denies any chest pain, palpitations, dizziness, recent PND or orthopnea. Echocardiogram on 4/13/17 revealed normal LV size and function, EF >55%. Mild MR/TR. Mild pulm HTN.   Patient had a PCI on 4/19/17 JO to LM 60% into prox LCx 90%, patent EDUARD-LAD, LIMA-ramus, SVG-OM1, LV EF 60%.     PAST MEDICAL & SURGICAL HISTORY:  BPH (benign prostatic hyperplasia)  Hyperlipidemia  Hypertension  Deep venous thrombosis  Coronary artery disease  Status post peripheral artery bypass  Arm fracture, right  History of tonsillectomy  S/P CABG (coronary artery bypass graft)    FAMILY HISTORY:   Family history of acute myocardial infarction (Sibling)  Family history of cerebrovascular accident (CVA) (Sibling)    ALLERGIES: Morphine     HOME MEDICATIONS:  · 	pantoprazole 40 mg oral delayed release tablet: 1 tab(s) orally once a day, Last Dose Taken:    · 	sertraline 50 mg oral tablet: 1 tab(s) orally once a day, Last Dose Taken:    · 	tamsulosin 0.4 mg oral capsule: 1 cap(s) orally once a day, Last Dose Taken:    · 	atorvastatin 20 mg oral tablet: 1 tab(s) orally every other day, Last Dose Taken:    · 	potassium chloride 10 mEq oral tablet, extended release: 1 tab(s) orally once a day, Last Dose Taken:    · 	Xarelto 15 mg oral tablet: 1 tab(s) orally once a day (in the evening)- last dose 4/17  PM, Last Dose Taken:    · 	levocetirizine 5 mg oral tablet: 1 tab(s) orally once a day (in the evening), As Needed, Last Dose Taken:    · 	clopidogrel 75 mg oral tablet: 1 tab(s) orally once a day, Last Dose Taken:    · 	furosemide 40 mg oral tablet: 1 tab(s) orally once a day, Last Dose Taken:    · 	losartan 25 mg oral tablet: 1 tab(s) orally once a day, Last Dose Taken:    · 	Dulera 100 mcg-5 mcg/inh inhalation aerosol: 2 puff(s) inhaled 2 times a day, Last Dose Taken:    · 	Stiolto Respimat 2.5 mcg-2.5 mcg inhalation aerosol: 2 puff(s) inhaled every 24 hours, Last Dose Taken:          REVIEW OF SYSTEMS:  CONSTITUTIONAL: No fever, weight loss, or fatigue  NEUROLOGICAL: No headaches, memory loss, loss of strength, numbness, or tremors  PSYCHIATRIC: No depression, anxiety, mood swings, or difficulty sleeping  RESPIRATORY: No cough, wheezing, chills or hemoptysis; No Shortness of Breath  CARDIOVASCULAR: No chest pain, palpitations, passing out, dizziness, or leg swelling  GASTROINTESTINAL: No abdominal or epigastric pain. No nausea, vomiting, or hematemesis; No diarrhea or constipation. No melena or hematochezia.  SKIN: No itching, burning, rashes, or lesions   MUSCULOSKELETAL: + hip pain 	    PHYSICAL EXAM:  T(C): 36.1, Max: 36.1 (04-19 @ 16:29)  T(F): 97, Max: 97 (04-19 @ 16:29)  HR: 72 (60 - 72)  BP: 158/75 (136/69 - 158/75)  RR: 16 (16 - 16)  SpO2: 96% (95% - 97%)  Height (cm): 182.9 (04-19 @ 14:00)  Weight (kg): 89.7 (04-19 @ 14:00)  BMI (kg/m2): 26.8 (04-19 @ 14:00)    Appearance: Normal	  Neurologic: Non-focal  Psychiatric: AxOx3, normal mood and affect  HEENT:   Normal oral mucosa, PERRL, EOMI	  Lymphatic: No lymphadenopathy  Cardiovascular: Normal S1 S2, No JVD, No murmurs, No edema  Respiratory: Lungs clear to auscultation	  Gastrointestinal:  Soft, Non-tender, + BS	  Skin: No rashes, No ecchymoses, No cyanosis	  Extremities: Normal range of motion, No clubbing, cyanosis or edema  Vascular: Peripheral pulses palpable 1+ bilaterally  	  LABS:	                          15.2   9.2   )-----------( 210      ( 19 Apr 2017 08:13 )             43.9     04-19    140  |  106  |  27<H>  ----------------------------<  116<H>  3.6   |  24  |  1.29    Ca    9.3      19 Apr 2017 08:13      Hemoglobin A1C, Whole Blood: 5.8 % (04-19 @ 08:13)    Cholesterol, Serum: 163 mg/dL (04-19 @ 08:13)  HDL Cholesterol, Serum: 43 mg/dL (04-19 @ 08:13)  Triglycerides, Serum: 239 mg/dL (04-19 @ 08:13)  Direct LDL: 72 mg/dL (04-19 @ 08:13)    C-Reactive Protein, Serum: 0.44 mg/dL (04-19 @ 08:13)        10 "S" ASSESSMENT PLAN: SMOKING, SITTING, SUGAR, SALT, SOME FATS, SOCIAL, SLEEP, SIGNS, AND MEDS:  Tobacco usage: He smoked from 7 years old until he was 56. He smoked a half a pack per day. 24 year pack history.   Stress: He says he has a lot of stress due to his marriage and is currently taking Zoloft. Denies suicidal ideation.   ETOH: He is a former heavy drinker but stopped drinking about 4 years ago.   Caffeine: He has two cups of coffee per day with milk and sugar. He drinks a 2 L bottle of Coke on the weekends.   Hormone Replacement: Denies.   Sleep Disorder: He denies snoring and wakes rested.   Inflammatory Condition: Denies.   Activity Level: He is limited in his activity due to arthritic hips but he walks his dog 3 times a day for 20-30 minutes each time.  Current Diet: Breakfast) 2 health bars. Lunch) sandwich. Dinner) meat/potato/vegetable. Snacks) fruit, chips, pretzels, and ice cream.   Heart Failure: Denies.   Myopathy with Statins: Denies.   GI/ Issues: + GERD, seeing GI.     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 on 7/21/16 which revealed 50% dLM lesion, subtotal occlusion of the mLAD, 80% pLCx lesion,  of OM1, small nondominant RCA, EDUARD-pLAD is patent, SVG-OM1 has a 30% lesion at the distal anastomosis site, LIMA to the ramus is patent, EF:55%, 12mmHg. Patient at which time was recommended to continue medical therapy. Patient now returned to cardiologist c/o progressively worsening ZHANG x several months. Patient states he cannot walk >200 yards prior to becoming winded and fatigued. Patient further reports bilateral LE edema. Patient denies any chest pain, palpitations, dizziness, recent PND or orthopnea. Echocardiogram on 4/13/17 revealed normal LV size and function, EF >55%. Mild MR/TR. Mild pulm HTN.   Patient had a PCI on 4/19/17 JO to LM 60% into prox LCx 90%, patent EDUARD-LAD, LIMA-ramus, SVG-OM1, LV EF 60%.     RECOMMENDATIONS:   Anti-platelet Therapy: DAPT per interventionalist recommendation with asa/Plavix.   Lipid Therapy: High dose statin therapy would likely benefit this patient.   Beta Blocker Therapy: Per your discretion.   ACE/ARB Therapy: Continue current therapy with losartan per your discretion.   Diet: Low-sodium, low-carbohydrate, Mediterranean diet. Written instruction on the Mediterranean diet was provided. We discussed patient's A1C level and the need to decrease simple carbohydrates in his diet.   Exercise: 30-45 minutes most days of the week once cleared to do so by their referring cardiologist.   Smoking: This patient is a non-smoker.   Stress Management: Instruction on mindfulness meditation was provided. Referral to a behaviorist might benefit this patient.   Sleep: Clinical evidence does not support the need for a sleep study at this time.     Thank you for the opportunity to see this patient. Please feel free to contact [facility] if there are any questions, or if you feel that your patient would benefit from continued follow-up visits with the Program.

## 2017-04-19 NOTE — DISCHARGE NOTE ADULT - PATIENT PORTAL LINK FT
“You can access the [facility] Patient Portal, offered by [facility], by registering with the following website: [email or web address]”

## 2017-04-19 NOTE — PROGRESS NOTE ADULT - SUBJECTIVE AND OBJECTIVE BOX
Interventional Cardiology PA Sheath Pull Note    Pt without complaints. VSS      Pre-Sheath Removal:    Left groin 6F venous sheath in place, no hematoma, no bleed    Pulses:    +2 DP intact left      Hemostasis Achieved with:   11 minutes manual pressure    Vasovagal Reaction: No    Meds Given:      Post-Sheath Removal:    Left groin no hematoma, no bleed, no bruit    Pulses:    Left DP +2   A/P:  s/p JO to LM    -Continue bedrest (pt given instructions)  -Continue to monitor

## 2017-04-20 VITALS — TEMPERATURE: 98 F

## 2017-04-20 LAB
ANION GAP SERPL CALC-SCNC: 12 MMOL/L — SIGNIFICANT CHANGE UP (ref 9–16)
BUN SERPL-MCNC: 19 MG/DL — SIGNIFICANT CHANGE UP (ref 7–23)
CALCIUM SERPL-MCNC: 8.9 MG/DL — SIGNIFICANT CHANGE UP (ref 8.5–10.5)
CHLORIDE SERPL-SCNC: 108 MMOL/L — SIGNIFICANT CHANGE UP (ref 96–108)
CO2 SERPL-SCNC: 20 MMOL/L — LOW (ref 22–31)
CREAT SERPL-MCNC: 1.28 MG/DL — SIGNIFICANT CHANGE UP (ref 0.5–1.3)
GLUCOSE SERPL-MCNC: 104 MG/DL — HIGH (ref 70–99)
HCT VFR BLD CALC: 38.8 % — LOW (ref 39–50)
HGB BLD-MCNC: 13.2 G/DL — SIGNIFICANT CHANGE UP (ref 13–17)
MAGNESIUM SERPL-MCNC: 2.3 MG/DL — SIGNIFICANT CHANGE UP (ref 1.6–2.4)
MCHC RBC-ENTMCNC: 30 PG — SIGNIFICANT CHANGE UP (ref 27–34)
MCHC RBC-ENTMCNC: 34 G/DL — SIGNIFICANT CHANGE UP (ref 32–36)
MCV RBC AUTO: 88.2 FL — SIGNIFICANT CHANGE UP (ref 80–100)
PLATELET # BLD AUTO: 180 K/UL — SIGNIFICANT CHANGE UP (ref 150–400)
POTASSIUM SERPL-MCNC: 4.1 MMOL/L — SIGNIFICANT CHANGE UP (ref 3.5–5.3)
POTASSIUM SERPL-SCNC: 4.1 MMOL/L — SIGNIFICANT CHANGE UP (ref 3.5–5.3)
RBC # BLD: 4.4 M/UL — SIGNIFICANT CHANGE UP (ref 4.2–5.8)
RBC # FLD: 14 % — SIGNIFICANT CHANGE UP (ref 10.3–16.9)
SODIUM SERPL-SCNC: 140 MMOL/L — SIGNIFICANT CHANGE UP (ref 135–145)
WBC # BLD: 8.8 K/UL — SIGNIFICANT CHANGE UP (ref 3.8–10.5)
WBC # FLD AUTO: 8.8 K/UL — SIGNIFICANT CHANGE UP (ref 3.8–10.5)

## 2017-04-20 PROCEDURE — 82550 ASSAY OF CK (CPK): CPT

## 2017-04-20 PROCEDURE — C1889: CPT

## 2017-04-20 PROCEDURE — C1887: CPT

## 2017-04-20 PROCEDURE — C1894: CPT

## 2017-04-20 PROCEDURE — 99239 HOSP IP/OBS DSCHRG MGMT >30: CPT

## 2017-04-20 PROCEDURE — 36415 COLL VENOUS BLD VENIPUNCTURE: CPT

## 2017-04-20 PROCEDURE — C1725: CPT

## 2017-04-20 PROCEDURE — 80061 LIPID PANEL: CPT

## 2017-04-20 PROCEDURE — 85027 COMPLETE CBC AUTOMATED: CPT

## 2017-04-20 PROCEDURE — 82553 CREATINE MB FRACTION: CPT

## 2017-04-20 PROCEDURE — C1769: CPT

## 2017-04-20 PROCEDURE — 80048 BASIC METABOLIC PNL TOTAL CA: CPT

## 2017-04-20 PROCEDURE — 85610 PROTHROMBIN TIME: CPT

## 2017-04-20 PROCEDURE — 83735 ASSAY OF MAGNESIUM: CPT

## 2017-04-20 PROCEDURE — 86140 C-REACTIVE PROTEIN: CPT

## 2017-04-20 PROCEDURE — C1874: CPT

## 2017-04-20 PROCEDURE — 93005 ELECTROCARDIOGRAM TRACING: CPT

## 2017-04-20 PROCEDURE — 83036 HEMOGLOBIN GLYCOSYLATED A1C: CPT

## 2017-04-20 PROCEDURE — 85025 COMPLETE CBC W/AUTO DIFF WBC: CPT

## 2017-04-20 PROCEDURE — C1760: CPT

## 2017-04-20 PROCEDURE — 85730 THROMBOPLASTIN TIME PARTIAL: CPT

## 2017-04-20 PROCEDURE — 94640 AIRWAY INHALATION TREATMENT: CPT

## 2017-04-20 RX ORDER — ATORVASTATIN CALCIUM 80 MG/1
1 TABLET, FILM COATED ORAL
Qty: 0 | Refills: 0 | COMMUNITY

## 2017-04-20 RX ORDER — ATORVASTATIN CALCIUM 80 MG/1
1 TABLET, FILM COATED ORAL
Qty: 15 | Refills: 2 | OUTPATIENT
Start: 2017-04-20 | End: 2017-07-18

## 2017-04-20 RX ORDER — ASPIRIN/CALCIUM CARB/MAGNESIUM 324 MG
1 TABLET ORAL
Qty: 0 | Refills: 0 | COMMUNITY
Start: 2017-04-20

## 2017-04-20 RX ORDER — NITROGLYCERIN 6.5 MG
1 CAPSULE, EXTENDED RELEASE ORAL
Qty: 1 | Refills: 0 | OUTPATIENT
Start: 2017-04-20

## 2017-04-20 RX ORDER — ACETAMINOPHEN 500 MG
650 TABLET ORAL EVERY 6 HOURS
Qty: 0 | Refills: 0 | Status: DISCONTINUED | OUTPATIENT
Start: 2017-04-20 | End: 2017-04-20

## 2017-04-20 RX ORDER — CLOPIDOGREL BISULFATE 75 MG/1
1 TABLET, FILM COATED ORAL
Qty: 90 | Refills: 4 | OUTPATIENT
Start: 2017-04-20 | End: 2018-07-13

## 2017-04-20 RX ORDER — CLOPIDOGREL BISULFATE 75 MG/1
1 TABLET, FILM COATED ORAL
Qty: 0 | Refills: 0 | COMMUNITY

## 2017-04-20 RX ORDER — ASPIRIN/CALCIUM CARB/MAGNESIUM 324 MG
1 TABLET ORAL
Qty: 90 | Refills: 4 | OUTPATIENT
Start: 2017-04-20 | End: 2018-07-13

## 2017-04-20 RX ADMIN — Medication 650 MILLIGRAM(S): at 06:38

## 2017-04-20 RX ADMIN — PANTOPRAZOLE SODIUM 40 MILLIGRAM(S): 20 TABLET, DELAYED RELEASE ORAL at 06:34

## 2017-04-20 RX ADMIN — CLOPIDOGREL BISULFATE 75 MILLIGRAM(S): 75 TABLET, FILM COATED ORAL at 11:05

## 2017-04-20 RX ADMIN — BUDESONIDE AND FORMOTEROL FUMARATE DIHYDRATE 2 PUFF(S): 160; 4.5 AEROSOL RESPIRATORY (INHALATION) at 06:34

## 2017-04-20 RX ADMIN — LOSARTAN POTASSIUM 25 MILLIGRAM(S): 100 TABLET, FILM COATED ORAL at 06:34

## 2017-04-20 RX ADMIN — SERTRALINE 50 MILLIGRAM(S): 25 TABLET, FILM COATED ORAL at 11:05

## 2017-04-20 RX ADMIN — Medication 81 MILLIGRAM(S): at 11:05

## 2017-04-20 RX ADMIN — LORATADINE 10 MILLIGRAM(S): 10 TABLET ORAL at 11:05

## 2017-04-24 DIAGNOSIS — I48.0 PAROXYSMAL ATRIAL FIBRILLATION: ICD-10-CM

## 2017-04-24 DIAGNOSIS — I25.10 ATHEROSCLEROTIC HEART DISEASE OF NATIVE CORONARY ARTERY WITHOUT ANGINA PECTORIS: ICD-10-CM

## 2017-04-24 DIAGNOSIS — I82.409 ACUTE EMBOLISM AND THROMBOSIS OF UNSPECIFIED DEEP VEINS OF UNSPECIFIED LOWER EXTREMITY: ICD-10-CM

## 2017-04-24 DIAGNOSIS — Z87.891 PERSONAL HISTORY OF NICOTINE DEPENDENCE: ICD-10-CM

## 2017-04-24 DIAGNOSIS — E78.5 HYPERLIPIDEMIA, UNSPECIFIED: ICD-10-CM

## 2017-04-24 DIAGNOSIS — I10 ESSENTIAL (PRIMARY) HYPERTENSION: ICD-10-CM

## 2017-04-24 DIAGNOSIS — I47.2 VENTRICULAR TACHYCARDIA: ICD-10-CM

## 2017-04-24 DIAGNOSIS — K21.9 GASTRO-ESOPHAGEAL REFLUX DISEASE WITHOUT ESOPHAGITIS: ICD-10-CM

## 2017-04-24 DIAGNOSIS — R73.03 PREDIABETES: ICD-10-CM

## 2017-04-25 DIAGNOSIS — I25.110 ATHEROSCLEROTIC HEART DISEASE OF NATIVE CORONARY ARTERY WITH UNSTABLE ANGINA PECTORIS: ICD-10-CM

## 2017-04-25 DIAGNOSIS — Z95.1 PRESENCE OF AORTOCORONARY BYPASS GRAFT: ICD-10-CM

## 2017-04-25 DIAGNOSIS — N40.0 BENIGN PROSTATIC HYPERPLASIA WITHOUT LOWER URINARY TRACT SYMPTOMS: ICD-10-CM

## 2017-04-25 DIAGNOSIS — I73.9 PERIPHERAL VASCULAR DISEASE, UNSPECIFIED: ICD-10-CM

## 2017-04-25 DIAGNOSIS — I27.2 OTHER SECONDARY PULMONARY HYPERTENSION: ICD-10-CM

## 2017-04-26 DIAGNOSIS — Z86.718 PERSONAL HISTORY OF OTHER VENOUS THROMBOSIS AND EMBOLISM: ICD-10-CM

## 2017-04-26 DIAGNOSIS — J44.9 CHRONIC OBSTRUCTIVE PULMONARY DISEASE, UNSPECIFIED: ICD-10-CM

## 2018-10-24 ENCOUNTER — EMERGENCY (EMERGENCY)
Facility: HOSPITAL | Age: 73
LOS: 1 days | End: 2018-10-24
Payer: MEDICARE

## 2018-10-24 ENCOUNTER — TRANSCRIPTION ENCOUNTER (OUTPATIENT)
Age: 73
End: 2018-10-24

## 2018-10-24 DIAGNOSIS — Z95.1 PRESENCE OF AORTOCORONARY BYPASS GRAFT: Chronic | ICD-10-CM

## 2018-10-24 DIAGNOSIS — Z98.89 OTHER SPECIFIED POSTPROCEDURAL STATES: Chronic | ICD-10-CM

## 2018-10-24 DIAGNOSIS — Z90.89 ACQUIRED ABSENCE OF OTHER ORGANS: Chronic | ICD-10-CM

## 2018-10-24 DIAGNOSIS — S42.301A UNSPECIFIED FRACTURE OF SHAFT OF HUMERUS, RIGHT ARM, INITIAL ENCOUNTER FOR CLOSED FRACTURE: Chronic | ICD-10-CM

## 2018-10-24 PROCEDURE — 74177 CT ABD & PELVIS W/CONTRAST: CPT | Mod: 26

## 2018-10-24 PROCEDURE — 71046 X-RAY EXAM CHEST 2 VIEWS: CPT | Mod: 26

## 2018-10-24 PROCEDURE — 99284 EMERGENCY DEPT VISIT MOD MDM: CPT

## 2018-10-25 ENCOUNTER — EMERGENCY (EMERGENCY)
Facility: HOSPITAL | Age: 73
LOS: 1 days | End: 2018-10-25
Payer: MEDICARE

## 2018-10-25 DIAGNOSIS — S42.301A UNSPECIFIED FRACTURE OF SHAFT OF HUMERUS, RIGHT ARM, INITIAL ENCOUNTER FOR CLOSED FRACTURE: Chronic | ICD-10-CM

## 2018-10-25 DIAGNOSIS — Z98.89 OTHER SPECIFIED POSTPROCEDURAL STATES: Chronic | ICD-10-CM

## 2018-10-25 DIAGNOSIS — Z95.1 PRESENCE OF AORTOCORONARY BYPASS GRAFT: Chronic | ICD-10-CM

## 2018-10-25 DIAGNOSIS — Z90.89 ACQUIRED ABSENCE OF OTHER ORGANS: Chronic | ICD-10-CM

## 2018-10-25 PROCEDURE — 99284 EMERGENCY DEPT VISIT MOD MDM: CPT

## 2018-10-25 PROCEDURE — 93970 EXTREMITY STUDY: CPT | Mod: 26

## 2018-10-27 ENCOUNTER — EMERGENCY (EMERGENCY)
Facility: HOSPITAL | Age: 73
LOS: 1 days | End: 2018-10-27
Payer: MEDICARE

## 2018-10-27 DIAGNOSIS — S42.301A UNSPECIFIED FRACTURE OF SHAFT OF HUMERUS, RIGHT ARM, INITIAL ENCOUNTER FOR CLOSED FRACTURE: Chronic | ICD-10-CM

## 2018-10-27 DIAGNOSIS — Z95.1 PRESENCE OF AORTOCORONARY BYPASS GRAFT: Chronic | ICD-10-CM

## 2018-10-27 DIAGNOSIS — Z98.89 OTHER SPECIFIED POSTPROCEDURAL STATES: Chronic | ICD-10-CM

## 2018-10-27 DIAGNOSIS — Z90.89 ACQUIRED ABSENCE OF OTHER ORGANS: Chronic | ICD-10-CM

## 2018-10-27 PROCEDURE — 99283 EMERGENCY DEPT VISIT LOW MDM: CPT

## 2019-07-02 ENCOUNTER — OUTPATIENT (OUTPATIENT)
Dept: OUTPATIENT SERVICES | Facility: HOSPITAL | Age: 74
LOS: 1 days | End: 2019-07-02

## 2019-07-02 DIAGNOSIS — Z95.1 PRESENCE OF AORTOCORONARY BYPASS GRAFT: Chronic | ICD-10-CM

## 2019-07-02 DIAGNOSIS — Z98.89 OTHER SPECIFIED POSTPROCEDURAL STATES: Chronic | ICD-10-CM

## 2019-07-02 DIAGNOSIS — Z90.89 ACQUIRED ABSENCE OF OTHER ORGANS: Chronic | ICD-10-CM

## 2019-07-02 DIAGNOSIS — S42.301A UNSPECIFIED FRACTURE OF SHAFT OF HUMERUS, RIGHT ARM, INITIAL ENCOUNTER FOR CLOSED FRACTURE: Chronic | ICD-10-CM

## 2019-07-10 ENCOUNTER — RECORD ABSTRACTING (OUTPATIENT)
Age: 74
End: 2019-07-10

## 2019-07-11 ENCOUNTER — APPOINTMENT (OUTPATIENT)
Dept: CARDIOLOGY | Facility: CLINIC | Age: 74
End: 2019-07-11
